# Patient Record
Sex: FEMALE | Race: WHITE | NOT HISPANIC OR LATINO | Employment: FULL TIME | URBAN - METROPOLITAN AREA
[De-identification: names, ages, dates, MRNs, and addresses within clinical notes are randomized per-mention and may not be internally consistent; named-entity substitution may affect disease eponyms.]

---

## 2017-01-12 ENCOUNTER — ANESTHESIA EVENT (OUTPATIENT)
Dept: SURGERY | Facility: HOSPITAL | Age: 42
End: 2017-01-12
Payer: COMMERCIAL

## 2017-01-13 ENCOUNTER — SURGERY (OUTPATIENT)
Age: 42
End: 2017-01-13

## 2017-01-13 ENCOUNTER — ANESTHESIA (OUTPATIENT)
Dept: SURGERY | Facility: HOSPITAL | Age: 42
End: 2017-01-13
Payer: COMMERCIAL

## 2017-01-13 PROBLEM — T84.84XA PAINFUL ORTHOPAEDIC HARDWARE: Status: ACTIVE | Noted: 2017-01-13

## 2017-01-13 PROCEDURE — 25000003 PHARM REV CODE 250: Mod: PO | Performed by: NURSE ANESTHETIST, CERTIFIED REGISTERED

## 2017-01-13 PROCEDURE — 27200651 HC AIRWAY, LMA: Mod: PO | Performed by: NURSE ANESTHETIST, CERTIFIED REGISTERED

## 2017-01-13 PROCEDURE — 63600175 PHARM REV CODE 636 W HCPCS: Mod: PO | Performed by: NURSE ANESTHETIST, CERTIFIED REGISTERED

## 2017-01-13 PROCEDURE — D9220A PRA ANESTHESIA: Mod: ANES,,, | Performed by: ANESTHESIOLOGY

## 2017-01-13 PROCEDURE — 63600175 PHARM REV CODE 636 W HCPCS: Mod: PO | Performed by: ORTHOPAEDIC SURGERY

## 2017-01-13 PROCEDURE — D9220A PRA ANESTHESIA: Mod: CRNA,,, | Performed by: NURSE ANESTHETIST, CERTIFIED REGISTERED

## 2017-01-13 RX ORDER — PROPOFOL 10 MG/ML
VIAL (ML) INTRAVENOUS
Status: DISCONTINUED | OUTPATIENT
Start: 2017-01-13 | End: 2017-01-13

## 2017-01-13 RX ORDER — ACETAMINOPHEN 10 MG/ML
INJECTION, SOLUTION INTRAVENOUS
Status: DISCONTINUED | OUTPATIENT
Start: 2017-01-13 | End: 2017-01-13

## 2017-01-13 RX ORDER — FENTANYL CITRATE 50 UG/ML
INJECTION, SOLUTION INTRAMUSCULAR; INTRAVENOUS
Status: DISCONTINUED | OUTPATIENT
Start: 2017-01-13 | End: 2017-01-13

## 2017-01-13 RX ORDER — LIDOCAINE HCL/PF 100 MG/5ML
SYRINGE (ML) INTRAVENOUS
Status: DISCONTINUED | OUTPATIENT
Start: 2017-01-13 | End: 2017-01-13

## 2017-01-13 RX ORDER — MIDAZOLAM HYDROCHLORIDE 1 MG/ML
INJECTION, SOLUTION INTRAMUSCULAR; INTRAVENOUS
Status: DISCONTINUED | OUTPATIENT
Start: 2017-01-13 | End: 2017-01-13

## 2017-01-13 RX ORDER — ONDANSETRON 2 MG/ML
INJECTION INTRAMUSCULAR; INTRAVENOUS
Status: DISCONTINUED | OUTPATIENT
Start: 2017-01-13 | End: 2017-01-13

## 2017-01-13 RX ADMIN — MIDAZOLAM HYDROCHLORIDE 2 MG: 1 INJECTION, SOLUTION INTRAMUSCULAR; INTRAVENOUS at 07:01

## 2017-01-13 RX ADMIN — FENTANYL CITRATE 25 MCG: 50 INJECTION, SOLUTION INTRAMUSCULAR; INTRAVENOUS at 08:01

## 2017-01-13 RX ADMIN — ONDANSETRON 4 MG: 2 INJECTION, SOLUTION INTRAMUSCULAR; INTRAVENOUS at 08:01

## 2017-01-13 RX ADMIN — PROPOFOL 150 MG: 10 INJECTION, EMULSION INTRAVENOUS at 07:01

## 2017-01-13 RX ADMIN — BUPIVACAINE HYDROCHLORIDE AND EPINEPHRINE BITARTRATE 10 ML: 2.5; .0091 INJECTION, SOLUTION INFILTRATION; PERINEURAL at 08:01

## 2017-01-13 RX ADMIN — LIDOCAINE HYDROCHLORIDE 75 MG: 20 INJECTION PARENTERAL at 07:01

## 2017-01-13 RX ADMIN — FENTANYL CITRATE 50 MCG: 50 INJECTION, SOLUTION INTRAMUSCULAR; INTRAVENOUS at 07:01

## 2017-01-13 RX ADMIN — CEFAZOLIN SODIUM 2 G: 2 SOLUTION INTRAVENOUS at 07:01

## 2017-01-13 RX ADMIN — ACETAMINOPHEN 1000 MG: 10 INJECTION, SOLUTION INTRAVENOUS at 08:01

## 2017-01-13 NOTE — ANESTHESIA PREPROCEDURE EVALUATION
01/13/2017  Karolyn Flowers is a 41 y.o., female.    [facility] Anesthesia Evaluation    I have reviewed the Patient Summary Reports.    I have reviewed the Nursing Notes.      Review of Systems  Anesthesia Hx:  No problems with previous Anesthesia    Hepatic/GI:   GERD, well controlled        Physical Exam  General:  Well nourished                 Anesthesia Plan  Type of Anesthesia, risks & benefits discussed:  Anesthesia Type:  general  Patient's Preference:   Intra-op Monitoring Plan:   Intra-op Monitoring Plan Comments:   Post Op Pain Control Plan:   Post Op Pain Control Plan Comments:   Induction:   IV  Beta Blocker:  Patient is not currently on a Beta-Blocker (No further documentation required).       Informed Consent: Patient understands risks and agrees with Anesthesia plan.  Questions answered. Anesthesia consent signed with patient.  ASA Score: 2     Day of Surgery Review of History & Physical:    H&P update referred to the surgeon.         Ready For Surgery From Anesthesia Perspective.

## 2017-01-13 NOTE — ANESTHESIA POSTPROCEDURE EVALUATION
"Anesthesia Post Evaluation    Patient: Karolyn Flowers    Procedure(s) Performed: Procedure(s) (LRB):  REMOVAL-HARDWARE-LEG-KNEE (Left)    Final Anesthesia Type: general  Patient location during evaluation: PACU  Patient participation: Yes- Able to Participate  Level of consciousness: awake and alert  Post-procedure vital signs: reviewed and stable  Pain management: adequate  Airway patency: patent  PONV status at discharge: No PONV  Anesthetic complications: no      Cardiovascular status: blood pressure returned to baseline and hemodynamically stable  Respiratory status: unassisted  Hydration status: euvolemic  Follow-up not needed.        Visit Vitals    BP (!) 111/57 (BP Location: Left arm, Patient Position: Lying, BP Method: Automatic)    Pulse 84    Temp 36.7 °C (98 °F) (Skin)    Resp 12    Ht 4' 11" (1.499 m)    Wt 53.5 kg (118 lb)    LMP 01/10/2017    SpO2 100%    Breastfeeding No    BMI 23.83 kg/m2       Pain/Marina Score: Pain Assessment Performed: Yes (1/13/2017  8:40 AM)  Presence of Pain: complains of pain/discomfort (1/13/2017  8:40 AM)  Pain Rating Prior to Med Admin: 3 (1/13/2017  8:50 AM)  Marina Score: 8 (1/13/2017  8:40 AM)      "

## 2017-01-13 NOTE — TRANSFER OF CARE
"Anesthesia Transfer of Care Note    Patient: Karolyn Flowers    Procedure(s) Performed: Procedure(s) (LRB):  REMOVAL-HARDWARE-LEG-KNEE (Left)    Patient location: PACU    Anesthesia Type: general    Transport from OR: Transported from OR on room air with adequate spontaneous ventilation    Post pain: adequate analgesia    Post assessment: no apparent anesthetic complications and tolerated procedure well    Post vital signs: stable    Level of consciousness: awake and alert    Nausea/Vomiting: no nausea/vomiting    Complications: none          Last vitals:   Visit Vitals    /74 (BP Location: Right arm, Patient Position: Lying, BP Method: Automatic)    Pulse 82    Temp 36.8 °C (98.3 °F) (Oral)    Resp 18    Ht 4' 11" (1.499 m)    Wt 53.5 kg (118 lb)    LMP 01/10/2017    SpO2 97%    Breastfeeding No    BMI 23.83 kg/m2     "

## 2017-01-25 ENCOUNTER — OFFICE VISIT (OUTPATIENT)
Dept: ORTHOPEDICS | Facility: CLINIC | Age: 42
End: 2017-01-25
Payer: COMMERCIAL

## 2017-01-25 VITALS
BODY MASS INDEX: 23.79 KG/M2 | HEART RATE: 85 BPM | DIASTOLIC BLOOD PRESSURE: 79 MMHG | WEIGHT: 118 LBS | HEIGHT: 59 IN | SYSTOLIC BLOOD PRESSURE: 114 MMHG

## 2017-01-25 DIAGNOSIS — T84.84XA PAINFUL ORTHOPAEDIC HARDWARE: Primary | ICD-10-CM

## 2017-01-25 PROCEDURE — 99024 POSTOP FOLLOW-UP VISIT: CPT | Mod: S$GLB,,, | Performed by: ORTHOPAEDIC SURGERY

## 2017-01-25 PROCEDURE — 99999 PR PBB SHADOW E&M-EST. PATIENT-LVL III: CPT | Mod: PBBFAC,,, | Performed by: ORTHOPAEDIC SURGERY

## 2017-01-25 NOTE — PROGRESS NOTES
This note was created using Dragon dictation software.  It occasionally misinterpreted phrases or words.      Date of surgery: January 13, 2017    Chief complaint: Left knee pain    History of present illness: 41-year-old female who underwent left hardware removal and bone grafting after removal of an anterior cruciate ligament screw and washer.  Patient is doing great.  No pain.  No wound complications.    Physical exam: Examination left knee shows well-healing surgical incision.  Mild puffiness underneath the incision.  No redness or erythema or drainage.    X-rays: None    Assessment: Status post left hardware removal    Plan: I reviewed the procedure with her today.  The puffiness is normal given the implementation of the bone allograft.  Okay to shower and bathe.  Okay to do some scar massage and skin lotion.  Follow-up in 3 weeks for final wound check.  Okay to start working out.

## 2017-02-15 ENCOUNTER — OFFICE VISIT (OUTPATIENT)
Dept: ORTHOPEDICS | Facility: CLINIC | Age: 42
End: 2017-02-15
Payer: COMMERCIAL

## 2017-02-15 VITALS
BODY MASS INDEX: 23.79 KG/M2 | HEIGHT: 59 IN | HEART RATE: 96 BPM | WEIGHT: 118 LBS | SYSTOLIC BLOOD PRESSURE: 120 MMHG | DIASTOLIC BLOOD PRESSURE: 74 MMHG

## 2017-02-15 DIAGNOSIS — T84.84XA PAINFUL ORTHOPAEDIC HARDWARE: Primary | ICD-10-CM

## 2017-02-15 PROCEDURE — 99024 POSTOP FOLLOW-UP VISIT: CPT | Mod: S$GLB,,, | Performed by: ORTHOPAEDIC SURGERY

## 2017-02-15 PROCEDURE — 99999 PR PBB SHADOW E&M-EST. PATIENT-LVL III: CPT | Mod: PBBFAC,,, | Performed by: ORTHOPAEDIC SURGERY

## 2017-02-15 RX ORDER — ESTRADIOL 10 UG/1
INSERT VAGINAL
COMMUNITY
Start: 2017-01-03

## 2017-02-15 NOTE — PROGRESS NOTES
This note was created using Dragon dictation software.  It occasionally misinterpreted phrases or words.      Date of surgery: January 13, 2017    Chief complaint: Left knee pain    History of present illness: 41-year-old female who underwent left hardware removal and bone grafting after removal of an anterior cruciate ligament screw and washer.  Patient is doing great.  No pain.  No wound complications.    Physical exam: Examination left knee shows well-healed surgical incision. No more puffiness underneath the incision.  No redness or erythema or drainage.    X-rays: None    Assessment: Status post left hardware removal    Plan: The puffiness is normal given the implementation of the bone allograft.  Activity as tolerated.  Follow-up as needed.

## 2021-09-24 ENCOUNTER — OFFICE VISIT (OUTPATIENT)
Dept: URGENT CARE | Facility: CLINIC | Age: 46
End: 2021-09-24
Payer: COMMERCIAL

## 2021-09-24 VITALS
HEART RATE: 103 BPM | BODY MASS INDEX: 23.39 KG/M2 | DIASTOLIC BLOOD PRESSURE: 73 MMHG | HEIGHT: 59 IN | TEMPERATURE: 98 F | OXYGEN SATURATION: 98 % | RESPIRATION RATE: 15 BRPM | SYSTOLIC BLOOD PRESSURE: 130 MMHG | WEIGHT: 116 LBS

## 2021-09-24 DIAGNOSIS — M54.9 UPPER BACK PAIN ON RIGHT SIDE: Primary | ICD-10-CM

## 2021-09-24 DIAGNOSIS — S29.019A THORACIC MYOFASCIAL STRAIN, INITIAL ENCOUNTER: ICD-10-CM

## 2021-09-24 PROCEDURE — 71046 XR CHEST PA AND LATERAL: ICD-10-PCS | Mod: S$GLB,,, | Performed by: RADIOLOGY

## 2021-09-24 PROCEDURE — 99203 OFFICE O/P NEW LOW 30 MIN: CPT | Mod: S$GLB,,, | Performed by: EMERGENCY MEDICINE

## 2021-09-24 PROCEDURE — 99203 PR OFFICE/OUTPT VISIT, NEW, LEVL III, 30-44 MIN: ICD-10-PCS | Mod: S$GLB,,, | Performed by: EMERGENCY MEDICINE

## 2021-09-24 PROCEDURE — 71046 X-RAY EXAM CHEST 2 VIEWS: CPT | Mod: S$GLB,,, | Performed by: RADIOLOGY

## 2021-09-24 RX ORDER — VORTIOXETINE 20 MG/1
TABLET, FILM COATED ORAL
COMMUNITY

## 2021-09-24 RX ORDER — THYROID 15 MG/1
TABLET ORAL
COMMUNITY

## 2021-09-24 RX ORDER — CYCLOBENZAPRINE HCL 10 MG
10 TABLET ORAL NIGHTLY
Qty: 5 TABLET | Refills: 0 | Status: SHIPPED | OUTPATIENT
Start: 2021-09-24 | End: 2021-09-29

## 2021-09-24 RX ORDER — PREDNISONE 20 MG/1
20 TABLET ORAL DAILY
Qty: 5 TABLET | Refills: 0 | Status: SHIPPED | OUTPATIENT
Start: 2021-09-24

## 2022-05-23 ENCOUNTER — OFFICE VISIT (OUTPATIENT)
Dept: URGENT CARE | Facility: CLINIC | Age: 47
End: 2022-05-23
Payer: COMMERCIAL

## 2022-05-23 VITALS
DIASTOLIC BLOOD PRESSURE: 81 MMHG | SYSTOLIC BLOOD PRESSURE: 123 MMHG | TEMPERATURE: 98 F | BODY MASS INDEX: 23.39 KG/M2 | OXYGEN SATURATION: 100 % | HEIGHT: 59 IN | HEART RATE: 97 BPM | WEIGHT: 116 LBS

## 2022-05-23 DIAGNOSIS — J02.9 SORE THROAT: ICD-10-CM

## 2022-05-23 DIAGNOSIS — U07.1 COVID-19: Primary | ICD-10-CM

## 2022-05-23 LAB
CTP QC/QA: YES
CTP QC/QA: YES
MOLECULAR STREP A: NEGATIVE
SARS-COV-2 RDRP RESP QL NAA+PROBE: POSITIVE

## 2022-05-23 PROCEDURE — U0002: ICD-10-PCS | Mod: QW,S$GLB,, | Performed by: PHYSICIAN ASSISTANT

## 2022-05-23 PROCEDURE — 99213 OFFICE O/P EST LOW 20 MIN: CPT | Mod: S$GLB,,, | Performed by: PHYSICIAN ASSISTANT

## 2022-05-23 PROCEDURE — 87651 STREP A DNA AMP PROBE: CPT | Mod: QW,S$GLB,, | Performed by: PHYSICIAN ASSISTANT

## 2022-05-23 PROCEDURE — 99213 PR OFFICE/OUTPT VISIT, EST, LEVL III, 20-29 MIN: ICD-10-PCS | Mod: S$GLB,,, | Performed by: PHYSICIAN ASSISTANT

## 2022-05-23 PROCEDURE — U0002 COVID-19 LAB TEST NON-CDC: HCPCS | Mod: QW,S$GLB,, | Performed by: PHYSICIAN ASSISTANT

## 2022-05-23 PROCEDURE — 87651 POCT STREP A MOLECULAR: ICD-10-PCS | Mod: QW,S$GLB,, | Performed by: PHYSICIAN ASSISTANT

## 2022-05-23 NOTE — PATIENT INSTRUCTIONS
Your test was POSITIVE for COVID-19 (coronavirus).       Please isolate yourself at home.  You may leave home and/or return to work once the following conditions are met:    If you were not hospitalized and are not moderately to severely immunocompromised:   More than 5 days since symptoms first appeared AND  More than 24 hours fever free without medications AND  Symptoms are improving  Continue to wear a mask around others for 5 additional days.    If you were hospitalized OR are moderately to severely immunocompromised:  More than 20 days since symptoms first appeared  More than 24 hours fever free without medications  Symptoms have improved    If you had no symptoms but tested positive:  More than 5 days since the date of the first positive test (20 days if moderately to severely immunocompromised). If you develop symptoms, then use the guidelines above.  Continue to wear a mask around others for 5 additional days.      Contact Tracing    As one of the next steps, you will receive a call or text from the [place] Department of Health ([facility]) COVID-19 Tracing Team. See the contact information below so you know not to ignore the health departments call. It is important that you contact them back immediately so they can help.      Contact Tracer Number:  [phone]  Caller ID for most carriers: [facility]     What is contact tracing?  Contact tracing is a process that helps identify everyone who has been in close contact with an infected person. Contact tracers let those people know they may have been exposed and guide them on next steps. Confidentiality is important for everyone; no one will be told who may have exposed them to the virus.  Your involvement is important. The more we know about where and how this virus is spreading, the better chance we have at stopping it from spreading further.  What does exposure mean?  Exposure means you have been within 6 feet for more than 15 minutes with a person who  has or had COVID-19.  What kind of questions do the contact tracers ask?  A contact tracer will confirm your basic contact information including name, address, phone number, and next of kin, as well as asking about any symptoms you may have had. Theyll also ask you how you think you may have gotten sick, such as places where you may have been exposed to the virus, and people you were with. Those names will never be shared with anyone outside of that call, and will only be used to help trace and stop the spread of the virus.   I have privacy concerns. How will the state use my information?  Your privacy about your health is important. All calls are completed using call centers that use the appropriate health privacy protection measures (HIPAA compliance), meaning that your patient information is safe. No one will ever ask you any questions related to immigration status. Your health comes first.   Do I have to participate?  You do not have to participate, but we strongly encourage you to. Contact tracing can help us catch and control new outbreaks as theyre developing to keep your friends and family safe.   What if I dont hear from anyone?  If you dont receive a call within 24 hours, you can call the number above right away to inquire about your status. That line is open from 8:00 am - 8:00 p.m., 7 days a week.  Contact tracing saves lives! Together, we have the power to beat this virus and keep our loved ones and neighbors safe.    For more information see CDC link below.      https://www.cdc.gov/coronavirus/2019-ncov/hcp/guidance-prevent-spread.html#precautions        Sources:  [facility], [place] [facility] and [facility]           Sincerely,     SUMA Church

## 2022-05-23 NOTE — LETTER
[address] [address], [address] ? [place] [address] ? Phone [phone] ? Fax [phone]           Return to Work/School    Patient: Karolyn Flowers  YOB: 1975   Date: 05/23/2022      To Whom It May Concern:     Karolyn Flowers was in contact with/seen in my office on 05/23/2022. COVID-19 is present in our communities across the [facility]. Not all patients are eligible or appropriate to be tested. In this situation, your employee meets the following criteria:     Karolyn Flowers has met the criteria for COVID-19 testing and has a POSITIVE result. She can return to work once they are asymptomatic for 24 hours without the use of fever reducing medications AND at least five days from the start of symptoms (or from the first positive result if they have no symptoms).      If you have any questions or concerns, or if I can be of further assistance, please do not hesitate to contact me.     Sincerely,    SUMA Church

## 2022-05-23 NOTE — PROGRESS NOTES
"Subjective:       Patient ID: Karolyn Flowers is a 47 y.o. female.    Vitals:  height is 4' 11" (1.499 m) and weight is 52.6 kg (116 lb). Her temperature is 97.8 °F (36.6 °C). Her blood pressure is 123/81 and her pulse is 97. Her oxygen saturation is 100%.     Chief Complaint: Sore Throat    Patient presents to urgent care today for sore throat and headaches that started yesterday  Patient has taken OTC medications with no symptom relief.   Patient has not had any known recent exposure to COVID.    Other  This is a new problem. The current episode started in the past 7 days. The problem occurs constantly. The problem has been gradually worsening. Associated symptoms include a sore throat. Pertinent negatives include no abdominal pain, anorexia, arthralgias, change in bowel habit, chest pain, chills, congestion, coughing, diaphoresis, fatigue, fever, headaches, joint swelling, myalgias, nausea, neck pain, numbness, rash, swollen glands, urinary symptoms, vertigo, visual change, vomiting or weakness. Treatments tried: OTC meds. The treatment provided no relief.       Constitution: Negative for chills, sweating, fatigue and fever.   HENT: Positive for sore throat. Negative for congestion.    Neck: Negative for neck pain.   Cardiovascular: Negative for chest pain.   Respiratory: Negative for cough.    Gastrointestinal: Negative for abdominal pain, nausea and vomiting.   Musculoskeletal: Negative for joint pain, joint swelling and muscle ache.   Skin: Negative for rash.   Neurological: Negative for history of vertigo, headaches and numbness.       Objective:      Physical Exam   Constitutional: She does not appear ill. No distress.   HENT:   Head: Normocephalic and atraumatic.   Ears:   Right Ear: External ear normal.   Left Ear: External ear normal.   Mouth/Throat: Mucous membranes are moist. Posterior oropharyngeal erythema present. No oropharyngeal exudate. Oropharynx is clear.   Eyes: Conjunctivae are normal. Right " eye exhibits no discharge. Left eye exhibits no discharge. Extraocular movement intact   Cardiovascular: Normal rate, regular rhythm and normal heart sounds.   No murmur heard.  Pulmonary/Chest: Effort normal and breath sounds normal. She has no wheezes. She has no rhonchi. She has no rales.   Abdominal: Normal appearance.   Musculoskeletal: Normal range of motion.         General: Normal range of motion.   Neurological: no focal deficit. She is alert.   Skin: Skin is warm, dry and not pale. jaundice  Psychiatric: Her behavior is normal. Mood, judgment and thought content normal.   Nursing note and vitals reviewed.        Assessment:       1. COVID-19    2. Sore throat          Plan:         COVID-19    Sore throat  -     POCT Strep A, Molecular  -     POCT COVID-19 Rapid Screening    Results for orders placed or performed in visit on 05/23/22   POCT Strep A, Molecular   Result Value Ref Range    Molecular Strep A, POC Negative Negative     Acceptable Yes    POCT COVID-19 Rapid Screening   Result Value Ref Range    POC Rapid COVID Positive (A) Negative     Acceptable Yes         Discussed symptomatic treatment.  Discussed sign/symptoms, reasons would need re-evaluation.        Patient Instructions   Your test was POSITIVE for COVID-19 (coronavirus).       Please isolate yourself at home.  You may leave home and/or return to work once the following conditions are met:    If you were not hospitalized and are not moderately to severely immunocompromised:    More than 5 days since symptoms first appeared AND   More than 24 hours fever free without medications AND   Symptoms are improving   Continue to wear a mask around others for 5 additional days.    If you were hospitalized OR are moderately to severely immunocompromised:   More than 20 days since symptoms first appeared   More than 24 hours fever free without medications   Symptoms have improved    If you had no symptoms but  tested positive:   More than 5 days since the date of the first positive test (20 days if moderately to severely immunocompromised). If you develop symptoms, then use the guidelines above.   Continue to wear a mask around others for 5 additional days.      Contact Tracing    As one of the next steps, you will receive a call or text from the [place] Department of Health ([facility]) COVID-19 Tracing Team. See the contact information below so you know not to ignore the health departments call. It is important that you contact them back immediately so they can help.      Contact Tracer Number:  [phone]  Caller ID for most carriers: [facility]     What is contact tracing?  · Contact tracing is a process that helps identify everyone who has been in close contact with an infected person. Contact tracers let those people know they may have been exposed and guide them on next steps. Confidentiality is important for everyone; no one will be told who may have exposed them to the virus.  · Your involvement is important. The more we know about where and how this virus is spreading, the better chance we have at stopping it from spreading further.  What does exposure mean?  · Exposure means you have been within 6 feet for more than 15 minutes with a person who has or had COVID-19.  What kind of questions do the contact tracers ask?  · A contact tracer will confirm your basic contact information including name, address, phone number, and next of kin, as well as asking about any symptoms you may have had. Theyll also ask you how you think you may have gotten sick, such as places where you may have been exposed to the virus, and people you were with. Those names will never be shared with anyone outside of that call, and will only be used to help trace and stop the spread of the virus.   I have privacy concerns. How will the state use my information?  · Your privacy about your health is important. All calls are completed  using call centers that use the appropriate health privacy protection measures (HIPAA compliance), meaning that your patient information is safe. No one will ever ask you any questions related to immigration status. Your health comes first.   Do I have to participate?  · You do not have to participate, but we strongly encourage you to. Contact tracing can help us catch and control new outbreaks as theyre developing to keep your friends and family safe.   What if I dont hear from anyone?  · If you dont receive a call within 24 hours, you can call the number above right away to inquire about your status. That line is open from 8:00 am - 8:00 p.m., 7 days a week.  Contact tracing saves lives! Together, we have the power to beat this virus and keep our loved ones and neighbors safe.    For more information see CDC link below.      https://www.cdc.gov/coronavirus/2019-ncov/hcp/guidance-prevent-spread.html#precautions        Sources:  [facility], [facility] and [facility]           Sincerely,     SUMA Church

## 2022-05-26 ENCOUNTER — TELEPHONE (OUTPATIENT)
Dept: URGENT CARE | Facility: CLINIC | Age: 47
End: 2022-05-26
Payer: COMMERCIAL

## 2023-08-10 ENCOUNTER — OFFICE VISIT (OUTPATIENT)
Dept: PULMONOLOGY | Facility: CLINIC | Age: 48
End: 2023-08-10
Payer: COMMERCIAL

## 2023-08-10 ENCOUNTER — HOSPITAL ENCOUNTER (OUTPATIENT)
Dept: RADIOLOGY | Facility: HOSPITAL | Age: 48
Discharge: HOME | End: 2023-08-10
Attending: NURSE PRACTITIONER
Payer: COMMERCIAL

## 2023-08-10 VITALS
DIASTOLIC BLOOD PRESSURE: 84 MMHG | BODY MASS INDEX: 22.76 KG/M2 | HEIGHT: 59 IN | OXYGEN SATURATION: 99 % | WEIGHT: 112.88 LBS | SYSTOLIC BLOOD PRESSURE: 124 MMHG | HEART RATE: 98 BPM

## 2023-08-10 DIAGNOSIS — Z22.7 TB LUNG, LATENT: Primary | ICD-10-CM

## 2023-08-10 DIAGNOSIS — Z22.7 TB LUNG, LATENT: ICD-10-CM

## 2023-08-10 PROCEDURE — 71046 XR CHEST PA AND LATERAL: ICD-10-PCS | Mod: 26,,, | Performed by: RADIOLOGY

## 2023-08-10 PROCEDURE — 71046 X-RAY EXAM CHEST 2 VIEWS: CPT | Mod: 26,,, | Performed by: RADIOLOGY

## 2023-08-10 PROCEDURE — 99204 OFFICE O/P NEW MOD 45 MIN: CPT | Mod: S$GLB,,, | Performed by: NURSE PRACTITIONER

## 2023-08-10 PROCEDURE — 71046 X-RAY EXAM CHEST 2 VIEWS: CPT | Mod: TC

## 2023-08-10 PROCEDURE — 99999 PR PBB SHADOW E&M-EST. PATIENT-LVL IV: CPT | Mod: PBBFAC,,, | Performed by: NURSE PRACTITIONER

## 2023-08-10 PROCEDURE — 99999 PR PBB SHADOW E&M-EST. PATIENT-LVL IV: ICD-10-PCS | Mod: PBBFAC,,, | Performed by: NURSE PRACTITIONER

## 2023-08-10 PROCEDURE — 99204 PR OFFICE/OUTPT VISIT, NEW, LEVL IV, 45-59 MIN: ICD-10-PCS | Mod: S$GLB,,, | Performed by: NURSE PRACTITIONER

## 2023-08-10 RX ORDER — TIRZEPATIDE 12.5 MG/.5ML
INJECTION, SOLUTION SUBCUTANEOUS
COMMUNITY
Start: 2023-08-09

## 2023-08-10 NOTE — LETTER
August 10, 2023        Karolyn Flowers  [address]  [place] [address]             [facility]  [address]  [place] [address]  Phone: [phone]  Fax: [phone]   Patient: Karolyn Flowers   YOB: 1975   Date of Visit: 8/10/2023     To whom it may concern:    [facility]. Flowers [facility] for positive   quantiferon gold test. Chest x-ray on 8/10/2023 negative   for acute or chronic changes. She is considered non contagious   and no restrictions.           If you have any questions or concerns, please don't hesitate to call.         Sincerely,          Britta Weiss, NP

## 2023-08-10 NOTE — PROGRESS NOTES
"8/10/2023    Karolyn Flowers  New Patient Consult    Chief Complaint   Patient presents with    positive tb       HPI: 8/10/2023- positive TB quantiferon gold blood test, works as . Goes into hospital clinics daily.   No complaint of cough, shortness of breath, chest tightness, or wheeze. No recent weight loss or night sweats.     Social Hx: lives with family and pet dog, no known Asbestosis exposure, no Smoking Hx  Family Hx: no Lung Cancer, COPD, or Asthma  Medical Hx: no previous pneumonia ; no previous shoulder/chest surgery      The chief compliant  problem is new to me  PFSH:  Past Medical History:   Diagnosis Date    Acid reflux     Depression     Fever blister          Past Surgical History:   Procedure Laterality Date    ACL replacement      left knee    BLADDER SUSPENSION      TUBAL LIGATION       Social History     Tobacco Use    Smoking status: Never    Smokeless tobacco: Never   Substance Use Topics    Alcohol use: Yes     Alcohol/week: 2.0 - 3.0 standard drinks of alcohol     Types: 2 - 3 Glasses of wine per week     Comment: social     Drug use: No     No family history on file.  Review of patient's allergies indicates:  No Known Allergies  I have reviewed past medical, family, and social history. I have reviewed previous nurse notes.        Performance Status:The patient's activity level is no limits with regular activity.      Review of Systems:  a review of eleven systems covering constitutional, Eye, HEENT, Psych, Respiratory, Cardiac, GI, , Musculoskeletal, Endocrine, Dermatologic was negative except for pertinent findings as listed ABOVE and below: pertinent positive as above, rest is good           Exam:Comprehensive exam done. /84 (BP Location: Right arm, Patient Position: Sitting, BP Method: Medium (Automatic))   Pulse 98   Ht 4' 11" (1.499 m)   Wt 51.2 kg (112 lb 14 oz)   SpO2 99% Comment: on room air at rest  BMI 22.80 kg/m²   Exam included Vitals " as listed  Constitutional:  oriented to person, place, and time.  appears well-developed. No distress.   Nose: Nose normal.   Mouth/Throat: Uvula is midline, oropharynx is clear and moist and mucous membranes are normal. No dental caries. No oropharyngeal exudate, posterior oropharyngeal edema, posterior oropharyngeal erythema or tonsillar abscesses.  Mallapatti (M) score 2  Eyes: Pupils are equal, round, and reactive to light.   Neck: No JVD present. No thyromegaly present.   Cardiovascular: Normal rate, regular rhythm and normal heart sounds. Exam reveals no gallop and no friction rub.   No murmur heard.  Pulmonary/Chest: Effort normal and breath sounds normal. No accessory muscle usage or stridor. No apnea and no tachypnea. No respiratory distress, decreased breath sounds, wheezes, rhonchi, rales, or tenderness.   Abdominal: Soft. exhibits no mass. There is no tenderness. No hepatosplenomegaly, hernias and normoactive bowel sounds  Musculoskeletal: Normal range of motion. exhibits no edema.   Lymphadenopathy:     no cervical adenopathy.   Neurological:  alert and oriented to person, place, and time. not disoriented.   Skin: Skin is warm and dry. Capillary refill takes less 2 sec. No cyanosis or erythema. No pallor. Nails show no clubbing.   Psychiatric: normal mood and affect. behavior is normal. Judgment and thought content normal.       Radiographs (ct chest and cxr) reviewed:   patient imaging studies reviewed and interpreted independently. My personal interpretation of most resent images include:    X-Ray Chest PA And Lateral  08/10/2023 Lungs clear        Labs: Patients labs reviewed including CBC and CMP  reviewed       Lab Results   Component Value Date    WBC 5.36 12/21/2016    RBC 4.51 12/21/2016    HGB 13.6 12/21/2016    HCT 41.4 12/21/2016    MCV 92 12/21/2016    MCH 30.2 12/21/2016    MCHC 32.9 12/21/2016    RDW 12.5 12/21/2016     12/21/2016    MPV 10.7 12/21/2016    GRAN 3.0 12/21/2016     GRAN 56.1 12/21/2016    LYMPH 1.8 12/21/2016    LYMPH 33.4 12/21/2016    MONO 0.5 12/21/2016    MONO 8.4 12/21/2016    EOS 0.1 12/21/2016    BASO 0.02 12/21/2016    EOSINOPHIL 1.5 12/21/2016    BASOPHIL 0.4 12/21/2016      Latest Reference Range & Units Most Recent   CO2 23 - 29 mmol/L 29  12/21/16 09:03     PFT was not done  Pulmonary Functions Testing Results:        Plan:  Clinical impression is apparently straight forward and impression with management as below.    Karolyn was seen today for positive tb.    Diagnoses and all orders for this visit:    TB lung, latent  -     T-SPOT TB Screening Test; Future    0    I spent over 50 mins of time with the patient. Reviewed results of the recently ordered labs, tests and studies; made directives with regards to the results. Over half of this time was spent couseling and coordinating care.     I have explained all of the above in detail and the patient understands all of the current recommendation(s). I have answered all of their questions to the best of my ability and to their complete satisfaction.   The patient is to continue with the current management plan.  I spent a total of 50 minutes on the day of the visit.This includes face to face time and non-face to face time preparing to see the patient (eg, review of tests), obtaining and/or reviewing separately obtained history, documenting clinical information in the electronic or other health record, independently interpreting results and communicating results to the patient/family/caregiver, or care coordinator.        Follow up in about 4 weeks (around 9/7/2023), or if symptoms worsen or fail to improve.      Discussed with patient above for education the following:      Patient Instructions   We discussed new options to treat Latent TB if Tspot test is positive

## 2023-08-15 RX ORDER — RIFAMPIN 150 MG/1
450 CAPSULE ORAL DAILY
Qty: 90 CAPSULE | Refills: 3 | Status: SHIPPED | OUTPATIENT
Start: 2023-08-15 | End: 2023-12-14 | Stop reason: ALTCHOICE

## 2023-09-15 ENCOUNTER — OFFICE VISIT (OUTPATIENT)
Dept: PULMONOLOGY | Facility: CLINIC | Age: 48
End: 2023-09-15
Payer: COMMERCIAL

## 2023-09-15 VITALS
SYSTOLIC BLOOD PRESSURE: 115 MMHG | DIASTOLIC BLOOD PRESSURE: 71 MMHG | HEART RATE: 100 BPM | OXYGEN SATURATION: 99 % | BODY MASS INDEX: 22.8 KG/M2 | HEIGHT: 59 IN

## 2023-09-15 DIAGNOSIS — Z22.7 TB LUNG, LATENT: Primary | ICD-10-CM

## 2023-09-15 PROCEDURE — 99999 PR PBB SHADOW E&M-EST. PATIENT-LVL III: CPT | Mod: PBBFAC,,, | Performed by: NURSE PRACTITIONER

## 2023-09-15 PROCEDURE — 99999 PR PBB SHADOW E&M-EST. PATIENT-LVL III: ICD-10-PCS | Mod: PBBFAC,,, | Performed by: NURSE PRACTITIONER

## 2023-09-15 PROCEDURE — 99213 PR OFFICE/OUTPT VISIT, EST, LEVL III, 20-29 MIN: ICD-10-PCS | Mod: S$GLB,,, | Performed by: NURSE PRACTITIONER

## 2023-09-15 PROCEDURE — 99213 OFFICE O/P EST LOW 20 MIN: CPT | Mod: S$GLB,,, | Performed by: NURSE PRACTITIONER

## 2023-09-15 RX ORDER — DARIFENACIN 15 MG/1
15 TABLET, EXTENDED RELEASE ORAL
COMMUNITY
Start: 2023-08-23

## 2023-09-15 RX ORDER — FLUOXETINE HYDROCHLORIDE 20 MG/1
20 CAPSULE ORAL EVERY MORNING
COMMUNITY
Start: 2023-09-11

## 2023-09-15 NOTE — PROGRESS NOTES
9/15/2023    Karolyn Flowers  Office Note    Chief Complaint   Patient presents with    Follow-up    latent TB       HPI:   9/15/2023- states doing well, latent TB medications has no intolerable side effects. On 3rd week of therapy Rifampin 450 mg. States nausea at first starting new medication but is improved with care.  No complaint of cough, shortness of breath, wheeze, or chest tightness.     8/10/2023- positive TB quantiferon gold blood test, works as . Goes into hospital clinics daily.   No complaint of cough, shortness of breath, chest tightness, or wheeze. No recent weight loss or night sweats.     Social Hx: lives with family and pet dog, no known Asbestosis exposure, no Smoking Hx  Family Hx: no Lung Cancer, COPD, or Asthma  Medical Hx: no previous pneumonia ; no previous shoulder/chest surgery      The chief compliant  problem is stable  PFSH:  Past Medical History:   Diagnosis Date    Acid reflux     Depression     Fever blister          Past Surgical History:   Procedure Laterality Date    ACL replacement      left knee    BLADDER SUSPENSION      TUBAL LIGATION       Social History     Tobacco Use    Smoking status: Never    Smokeless tobacco: Never   Substance Use Topics    Alcohol use: Yes     Alcohol/week: 2.0 - 3.0 standard drinks of alcohol     Types: 2 - 3 Glasses of wine per week     Comment: social     Drug use: No     No family history on file.  Review of patient's allergies indicates:  No Known Allergies  I have reviewed past medical, family, and social history. I have reviewed previous nurse notes.        Performance Status:The patient's activity level is no limits with regular activity.      Review of Systems:  a review of eleven systems covering constitutional, Eye, HEENT, Psych, Respiratory, Cardiac, GI, , Musculoskeletal, Endocrine, Dermatologic was negative except for pertinent findings as listed ABOVE and below: pertinent positive as above, rest is  "good           Exam:Comprehensive exam done. /71 (BP Location: Left arm, Patient Position: Sitting, BP Method: Medium (Automatic))   Pulse 100   Ht 4' 11" (1.499 m)   SpO2 99% Comment: on room air at rest  BMI 22.80 kg/m²   Exam included Vitals as listed, and patient's appearance and affect and alertness and mood, oral exam for yeast and hygiene and pharynx lesions and Mallapatti (M) score, neck with inspection for jvd and masses and thyroid abnormalities and lymph nodes (supraclavicular and infraclavicular nodes and axillary also examined and noted if abn), chest exam included symmetry and effort and fremitus and percussion and auscultation, cardiac exam included rhythm and gallops and murmur and rubs and jvd and edema, abdominal exam for mass and hepatosplenomegaly and tenderness and hernias and bowel sounds, Musculoskeletal exam with muscle tone and posture and mobility/gait and  strength, and skin for rashes and cyanosis and pallor and turgor, extremity for clubbing.  Findings were normal except for pertinent findings listed below:   M2  Breath sounds      Radiographs (ct chest and cxr) reviewed:   patient imaging studies reviewed and interpreted independently. My personal interpretation of most resent images include:    X-Ray Chest PA And Lateral  08/10/2023 Lungs clear      Labs: Patients labs reviewed including CBC and CMP  reviewed       Lab Results   Component Value Date    WBC 5.36 12/21/2016    RBC 4.51 12/21/2016    HGB 13.6 12/21/2016    HCT 41.4 12/21/2016    MCV 92 12/21/2016    MCH 30.2 12/21/2016    MCHC 32.9 12/21/2016    RDW 12.5 12/21/2016     12/21/2016    MPV 10.7 12/21/2016    GRAN 3.0 12/21/2016    GRAN 56.1 12/21/2016    LYMPH 1.8 12/21/2016    LYMPH 33.4 12/21/2016    MONO 0.5 12/21/2016    MONO 8.4 12/21/2016    EOS 0.1 12/21/2016    BASO 0.02 12/21/2016    EOSINOPHIL 1.5 12/21/2016    BASOPHIL 0.4 12/21/2016      Latest Reference Range & Units Most Recent   CO2 23 - " 29 mmol/L 29  12/21/16 09:03      Latest Reference Range & Units 05/23/22 16:58   SARS-CoV-2 RNA, Amplification, Qual Negative  Positive !   !: Data is abnormal      PFT reviewed  Pulmonary Functions Testing Results:        Plan:  Clinical impression is apparently straight forward and impression with management as below.    Karolyn was seen today for follow-up and latent tb.    Diagnoses and all orders for this visit:    TB lung, latent  Comments:  - continue current Rifampin prescription        Follow up in about 3 months (around 12/15/2023), or if symptoms worsen or fail to improve.      Discussed with patient above for education the following:      Patient Instructions   Continue Rifampin daily    Have blood test  to monitor liver health every month.

## 2023-09-18 ENCOUNTER — LAB VISIT (OUTPATIENT)
Dept: LAB | Facility: HOSPITAL | Age: 48
End: 2023-09-18
Attending: NURSE PRACTITIONER
Payer: COMMERCIAL

## 2023-09-18 DIAGNOSIS — Z22.7 TB LUNG, LATENT: ICD-10-CM

## 2023-09-18 LAB
ALBUMIN SERPL BCP-MCNC: 3.9 G/DL (ref 3.5–5.2)
ALP SERPL-CCNC: 49 U/L (ref 55–135)
ALT SERPL W/O P-5'-P-CCNC: 14 U/L (ref 10–44)
ANION GAP SERPL CALC-SCNC: 9 MMOL/L (ref 8–16)
AST SERPL-CCNC: 17 U/L (ref 10–40)
BILIRUB SERPL-MCNC: 0.4 MG/DL (ref 0.1–1)
BUN SERPL-MCNC: 6 MG/DL (ref 6–20)
CALCIUM SERPL-MCNC: 8.8 MG/DL (ref 8.7–10.5)
CHLORIDE SERPL-SCNC: 106 MMOL/L (ref 95–110)
CO2 SERPL-SCNC: 24 MMOL/L (ref 23–29)
CREAT SERPL-MCNC: 0.7 MG/DL (ref 0.5–1.4)
EST. GFR  (NO RACE VARIABLE): >60 ML/MIN/1.73 M^2
GLUCOSE SERPL-MCNC: 82 MG/DL (ref 70–110)
POTASSIUM SERPL-SCNC: 4 MMOL/L (ref 3.5–5.1)
PROT SERPL-MCNC: 6.3 G/DL (ref 6–8.4)
SODIUM SERPL-SCNC: 139 MMOL/L (ref 136–145)

## 2023-09-18 PROCEDURE — 36415 COLL VENOUS BLD VENIPUNCTURE: CPT | Performed by: NURSE PRACTITIONER

## 2023-09-18 PROCEDURE — 80053 COMPREHEN METABOLIC PANEL: CPT | Performed by: NURSE PRACTITIONER

## 2023-10-19 ENCOUNTER — LAB VISIT (OUTPATIENT)
Dept: LAB | Facility: HOSPITAL | Age: 48
End: 2023-10-19
Attending: NURSE PRACTITIONER
Payer: COMMERCIAL

## 2023-10-19 DIAGNOSIS — Z22.7 TB LUNG, LATENT: ICD-10-CM

## 2023-10-19 LAB
ALBUMIN SERPL BCP-MCNC: 4.1 G/DL (ref 3.5–5.2)
ALP SERPL-CCNC: 51 U/L (ref 55–135)
ALT SERPL W/O P-5'-P-CCNC: 13 U/L (ref 10–44)
ANION GAP SERPL CALC-SCNC: 11 MMOL/L (ref 8–16)
AST SERPL-CCNC: 19 U/L (ref 10–40)
BILIRUB SERPL-MCNC: 0.4 MG/DL (ref 0.1–1)
BUN SERPL-MCNC: 8 MG/DL (ref 6–20)
CALCIUM SERPL-MCNC: 9.2 MG/DL (ref 8.7–10.5)
CHLORIDE SERPL-SCNC: 104 MMOL/L (ref 95–110)
CO2 SERPL-SCNC: 25 MMOL/L (ref 23–29)
CREAT SERPL-MCNC: 0.8 MG/DL (ref 0.5–1.4)
EST. GFR  (NO RACE VARIABLE): >60 ML/MIN/1.73 M^2
GLUCOSE SERPL-MCNC: 79 MG/DL (ref 70–110)
POTASSIUM SERPL-SCNC: 4.1 MMOL/L (ref 3.5–5.1)
PROT SERPL-MCNC: 6.7 G/DL (ref 6–8.4)
SODIUM SERPL-SCNC: 140 MMOL/L (ref 136–145)

## 2023-10-19 PROCEDURE — 36415 COLL VENOUS BLD VENIPUNCTURE: CPT | Performed by: NURSE PRACTITIONER

## 2023-10-19 PROCEDURE — 80053 COMPREHEN METABOLIC PANEL: CPT | Performed by: NURSE PRACTITIONER

## 2023-11-20 ENCOUNTER — LAB VISIT (OUTPATIENT)
Dept: LAB | Facility: HOSPITAL | Age: 48
End: 2023-11-20
Attending: NURSE PRACTITIONER
Payer: COMMERCIAL

## 2023-11-20 DIAGNOSIS — Z22.7 TB LUNG, LATENT: ICD-10-CM

## 2023-11-20 LAB
ALBUMIN SERPL BCP-MCNC: 3.7 G/DL (ref 3.5–5.2)
ALP SERPL-CCNC: 39 U/L (ref 55–135)
ALT SERPL W/O P-5'-P-CCNC: 13 U/L (ref 10–44)
ANION GAP SERPL CALC-SCNC: 7 MMOL/L (ref 8–16)
AST SERPL-CCNC: 17 U/L (ref 10–40)
BILIRUB SERPL-MCNC: 0.4 MG/DL (ref 0.1–1)
BUN SERPL-MCNC: 13 MG/DL (ref 6–20)
CALCIUM SERPL-MCNC: 8.3 MG/DL (ref 8.7–10.5)
CHLORIDE SERPL-SCNC: 106 MMOL/L (ref 95–110)
CO2 SERPL-SCNC: 25 MMOL/L (ref 23–29)
CREAT SERPL-MCNC: 0.7 MG/DL (ref 0.5–1.4)
EST. GFR  (NO RACE VARIABLE): >60 ML/MIN/1.73 M^2
GLUCOSE SERPL-MCNC: 105 MG/DL (ref 70–110)
POTASSIUM SERPL-SCNC: 4 MMOL/L (ref 3.5–5.1)
PROT SERPL-MCNC: 5.8 G/DL (ref 6–8.4)
SODIUM SERPL-SCNC: 138 MMOL/L (ref 136–145)

## 2023-11-20 PROCEDURE — 36415 COLL VENOUS BLD VENIPUNCTURE: CPT | Performed by: NURSE PRACTITIONER

## 2023-11-20 PROCEDURE — 80053 COMPREHEN METABOLIC PANEL: CPT | Performed by: NURSE PRACTITIONER

## 2023-11-28 DIAGNOSIS — Z22.7 TB LUNG, LATENT: ICD-10-CM

## 2023-11-29 RX ORDER — RIFAMPIN 150 MG/1
450 CAPSULE ORAL DAILY
Qty: 90 CAPSULE | Refills: 3 | OUTPATIENT
Start: 2023-11-29

## 2023-12-14 ENCOUNTER — OFFICE VISIT (OUTPATIENT)
Dept: PULMONOLOGY | Facility: CLINIC | Age: 48
End: 2023-12-14
Payer: COMMERCIAL

## 2023-12-14 VITALS
BODY MASS INDEX: 22.8 KG/M2 | HEIGHT: 59 IN | HEART RATE: 85 BPM | OXYGEN SATURATION: 97 % | SYSTOLIC BLOOD PRESSURE: 104 MMHG | DIASTOLIC BLOOD PRESSURE: 74 MMHG

## 2023-12-14 DIAGNOSIS — B00.9 HERPES SIMPLEX INFECTION OF SKIN: ICD-10-CM

## 2023-12-14 DIAGNOSIS — Z22.7 TB LUNG, LATENT: Primary | ICD-10-CM

## 2023-12-14 PROCEDURE — 99999 PR PBB SHADOW E&M-EST. PATIENT-LVL IV: ICD-10-PCS | Mod: PBBFAC,,, | Performed by: NURSE PRACTITIONER

## 2023-12-14 PROCEDURE — 99999 PR PBB SHADOW E&M-EST. PATIENT-LVL IV: CPT | Mod: PBBFAC,,, | Performed by: NURSE PRACTITIONER

## 2023-12-14 PROCEDURE — 99213 OFFICE O/P EST LOW 20 MIN: CPT | Mod: S$GLB,,, | Performed by: NURSE PRACTITIONER

## 2023-12-14 PROCEDURE — 99213 PR OFFICE/OUTPT VISIT, EST, LEVL III, 20-29 MIN: ICD-10-PCS | Mod: S$GLB,,, | Performed by: NURSE PRACTITIONER

## 2023-12-14 RX ORDER — ACYCLOVIR 50 MG/G
OINTMENT TOPICAL
Qty: 30 G | Refills: 0 | Status: SHIPPED | OUTPATIENT
Start: 2023-12-14

## 2023-12-14 NOTE — PROGRESS NOTES
12/14/2023    Karolyn Flowers  Office Note    Chief Complaint   Patient presents with    Follow-up    Latent TB       HPI:     12/14/2023- completed Latent TB therapy for 4 months. No complaint of cough or night sweats.       9/15/2023- states doing well, latent TB medications has no intolerable side effects. On 3rd week of therapy Rifampin 450 mg. States nausea at first starting new medication but is improved with care.  No complaint of cough, shortness of breath, wheeze, or chest tightness.     8/10/2023- positive TB quantiferon gold blood test, works as . Goes into hospital clinics daily.   No complaint of cough, shortness of breath, chest tightness, or wheeze. No recent weight loss or night sweats.     Social Hx: lives with family and pet dog, no known Asbestosis exposure, no Smoking Hx  Family Hx: no Lung Cancer, COPD, or Asthma  Medical Hx: no previous pneumonia ; no previous shoulder/chest surgery      The chief compliant  problem is stable  PFSH:  Past Medical History:   Diagnosis Date    Acid reflux     Depression     Fever blister          Past Surgical History:   Procedure Laterality Date    ACL replacement      left knee    BLADDER SUSPENSION      TUBAL LIGATION       Social History     Tobacco Use    Smoking status: Never    Smokeless tobacco: Never   Substance Use Topics    Alcohol use: Yes     Alcohol/week: 2.0 - 3.0 standard drinks of alcohol     Types: 2 - 3 Glasses of wine per week     Comment: social     Drug use: No     No family history on file.  Review of patient's allergies indicates:  No Known Allergies  I have reviewed past medical, family, and social history. I have reviewed previous nurse notes.        Performance Status:The patient's activity level is no limits with regular activity.      Review of Systems:  a review of eleven systems covering constitutional, Eye, HEENT, Psych, Respiratory, Cardiac, GI, , Musculoskeletal, Endocrine, Dermatologic was  "negative except for pertinent findings as listed ABOVE and below: pertinent positive as above, rest is good           Exam:Comprehensive exam done. /74 (BP Location: Right arm, Patient Position: Sitting, BP Method: Medium (Automatic))   Pulse 85   Ht 4' 11" (1.499 m)   SpO2 97% Comment: on room air at rest  BMI 22.80 kg/m²   Exam included Vitals as listed, and patient's appearance and affect and alertness and mood, oral exam for yeast and hygiene and pharynx lesions and Mallapatti (M) score, neck with inspection for jvd and masses and thyroid abnormalities and lymph nodes (supraclavicular and infraclavicular nodes and axillary also examined and noted if abn), chest exam included symmetry and effort and fremitus and percussion and auscultation, cardiac exam included rhythm and gallops and murmur and rubs and jvd and edema, abdominal exam for mass and hepatosplenomegaly and tenderness and hernias and bowel sounds, Musculoskeletal exam with muscle tone and posture and mobility/gait and  strength, and skin for rashes and cyanosis and pallor and turgor, extremity for clubbing.  Findings were normal except for pertinent findings listed below:   M2  Breath sounds      Radiographs (ct chest and cxr) reviewed:   patient imaging studies reviewed and interpreted independently. My personal interpretation of most resent images include:    X-Ray Chest PA And Lateral  08/10/2023 Lungs clear      Labs: Patients labs reviewed including CBC and CMP  reviewed       Lab Results   Component Value Date    WBC 5.36 12/21/2016    RBC 4.51 12/21/2016    HGB 13.6 12/21/2016    HCT 41.4 12/21/2016    MCV 92 12/21/2016    MCH 30.2 12/21/2016    MCHC 32.9 12/21/2016    RDW 12.5 12/21/2016     12/21/2016    MPV 10.7 12/21/2016    GRAN 3.0 12/21/2016    GRAN 56.1 12/21/2016    LYMPH 1.8 12/21/2016    LYMPH 33.4 12/21/2016    MONO 0.5 12/21/2016    MONO 8.4 12/21/2016    EOS 0.1 12/21/2016    BASO 0.02 12/21/2016    EOSINOPHIL " 1.5 12/21/2016    BASOPHIL 0.4 12/21/2016      Latest Reference Range & Units Most Recent   CO2 23 - 29 mmol/L 29  12/21/16 09:03      Latest Reference Range & Units 05/23/22 16:58   SARS-CoV-2 RNA, Amplification, Qual Negative  Positive !   !: Data is abnormal      PFT reviewed  Pulmonary Functions Testing Results:        Plan:  Clinical impression is apparently straight forward and impression with management as below.    Karolyn was seen today for follow-up and latent tb.    Diagnoses and all orders for this visit:    TB lung, latent  Comments:  - completed therapy    Herpes simplex infection of skin  -     acyclovir 5% (ZOVIRAX) 5 % ointment; Apply topically 6 (six) times daily.          Follow up if symptoms worsen or fail to improve.      Discussed with patient above for education the following:      Patient Instructions   Use cream on sore    Your latent TB is treated. You still require a yearly chest x-ray.

## 2024-10-30 ENCOUNTER — HOSPITAL ENCOUNTER (OUTPATIENT)
Dept: RADIOLOGY | Facility: HOSPITAL | Age: 49
Discharge: HOME | End: 2024-10-30
Attending: NURSE PRACTITIONER
Payer: COMMERCIAL

## 2024-10-30 DIAGNOSIS — R06.02 SHORTNESS OF BREATH: Primary | ICD-10-CM

## 2024-10-30 DIAGNOSIS — R06.02 SHORTNESS OF BREATH: ICD-10-CM

## 2024-10-30 PROCEDURE — 71046 X-RAY EXAM CHEST 2 VIEWS: CPT | Mod: TC

## 2024-10-30 PROCEDURE — 71046 X-RAY EXAM CHEST 2 VIEWS: CPT | Mod: 26,,, | Performed by: RADIOLOGY

## 2024-12-18 ENCOUNTER — OFFICE VISIT (OUTPATIENT)
Dept: PULMONOLOGY | Facility: CLINIC | Age: 49
End: 2024-12-18
Payer: COMMERCIAL

## 2024-12-18 DIAGNOSIS — Z86.15 HISTORY OF LATENT TUBERCULOSIS: Primary | ICD-10-CM

## 2024-12-18 NOTE — PROGRESS NOTES
12/18/2024    Karolyn Flowers  The patient location is: [facility]  The chief complaint leading to consultation is: review Chest X-ray    Visit type: audiovisual    Face to Face time with patient: 15 minutes  30 minutes of total time spent on the encounter, which includes face to face time and non-face to face time preparing to see the patient (eg, review of tests), Obtaining and/or reviewing separately obtained history, Documenting clinical information in the electronic or other health record, Independently interpreting results (not separately reported) and communicating results to the patient/family/caregiver, or Care coordination (not separately reported).         Each patient to whom he or she provides medical services by telemedicine is:  (1) informed of the relationship between the physician and patient and the respective role of any other health care provider with respect to management of the patient; and (2) notified that he or she may decline to receive medical services by telemedicine and may withdraw from such care at any time.    Notes:       Chief Complaint   Patient presents with    Follow-up       HPI:     12/18/2024- states doing well; no complaints of weight loss, fever, or night sweats.  Currently working for a pharmaceutical company.  Requesting letter to present to hospital systems who request TB testing.  Completed treated for latent TB in 2023.  No adverse side-effects from therapy.  Review of chest x-ray from October 30, 2024 shows no abnormalities.    12/14/2023- completed Latent TB therapy for 4 months. No complaint of cough or night sweats.       9/15/2023- states doing well, latent TB medications has no intolerable side effects. On 3rd week of therapy Rifampin 450 mg. States nausea at first starting new medication but is improved with care.  No complaint of cough, shortness of breath, wheeze, or chest tightness.     8/10/2023- positive TB quantiferon gold blood test, works as  . Goes into hospital clinics daily.   No complaint of cough, shortness of breath, chest tightness, or wheeze. No recent weight loss or night sweats.     Social Hx: lives with family and pet dog, no known Asbestosis exposure, no Smoking Hx  Family Hx: no Lung Cancer, COPD, or Asthma  Medical Hx: no previous pneumonia ; no previous shoulder/chest surgery      The chief compliant  problem is stable  PFSH:  Past Medical History:   Diagnosis Date    Acid reflux     Depression     Fever blister          Past Surgical History:   Procedure Laterality Date    ACL replacement      left knee    BLADDER SUSPENSION      TUBAL LIGATION       Social History     Tobacco Use    Smoking status: Never    Smokeless tobacco: Never   Substance Use Topics    Alcohol use: Yes     Alcohol/week: 2.0 - 3.0 standard drinks of alcohol     Types: 2 - 3 Glasses of wine per week     Comment: social     Drug use: No     No family history on file.  Review of patient's allergies indicates:  No Known Allergies  I have reviewed past medical, family, and social history. I have reviewed previous nurse notes.        Performance Status:The patient's activity level is no limits with regular activity.      Review of Systems:  a review of eleven systems covering constitutional, Eye, HEENT, Psych, Respiratory, Cardiac, GI, , Musculoskeletal, Endocrine, Dermatologic was negative except for pertinent findings as listed ABOVE and below: pertinent positive as above, rest is good           Exam:Comprehensive exam done. There were no vitals taken for this visit.  Exam included Vitals as listed, and patient's appearance and affect and alertness and mood, oral exam for yeast and hygiene and pharynx lesions and Mallapatti (M) score, neck with inspection for jvd and masses and thyroid abnormalities and lymph nodes (supraclavicular and infraclavicular nodes and axillary also examined and noted if abn), chest exam included symmetry and  effort and fremitus and percussion and auscultation, cardiac exam included rhythm and gallops and murmur and rubs and jvd and edema, abdominal exam for mass and hepatosplenomegaly and tenderness and hernias and bowel sounds, Musculoskeletal exam with muscle tone and posture and mobility/gait and  strength, and skin for rashes and cyanosis and pallor and turgor, extremity for clubbing.  Findings were normal except for pertinent findings listed below:   M2  Breath sounds      Radiographs (ct chest and cxr) reviewed: CXR  patient imaging studies reviewed and interpreted independently. My personal interpretation of most resent images include:    CXR 10/30/24 lungs clear  X-Ray Chest PA And Lateral  08/10/2023 Lungs clear      Labs: Patients labs reviewed including CBC and CMP  reviewed       Lab Results   Component Value Date    WBC 5.36 12/21/2016    RBC 4.51 12/21/2016    HGB 13.6 12/21/2016    HCT 41.4 12/21/2016    MCV 92 12/21/2016    MCH 30.2 12/21/2016    MCHC 32.9 12/21/2016    RDW 12.5 12/21/2016     12/21/2016    MPV 10.7 12/21/2016    GRAN 3.0 12/21/2016    GRAN 56.1 12/21/2016    LYMPH 1.8 12/21/2016    LYMPH 33.4 12/21/2016    MONO 0.5 12/21/2016    MONO 8.4 12/21/2016    EOS 0.1 12/21/2016    BASO 0.02 12/21/2016    EOSINOPHIL 1.5 12/21/2016    BASOPHIL 0.4 12/21/2016      Latest Reference Range & Units Most Recent   CO2 23 - 29 mmol/L 29  12/21/16 09:03      Latest Reference Range & Units 05/23/22 16:58   SARS-CoV-2 RNA, Amplification, Qual Negative  Positive !   !: Data is abnormal            Plan:  Clinical impression is apparently straight forward and impression with management as below.    Karolyn was seen today for follow-up.    Diagnoses and all orders for this visit:    History of latent tuberculosis  Comments:  - CXR clear, repeat yearly        Follow up in about 1 year (around 12/18/2025), or if symptoms worsen or fail to improve.      Discussed with patient above for education the  following:      Patient Instructions   Letter written as requested, sent through [email or web address]

## 2024-12-18 NOTE — LETTER
December 18, 2024        Karolyn Flowers             [facility]  [address]  [address]  [place] [address]  Phone: [phone]  Fax: [phone]   Patient: Karolyn Flowers   MR Number: [id number]   YOB: 1975   Date of Visit: 12/18/2024     To whom it may concern:    Ms. Flowers followed by [facility] for a positive   quantiferon gold test. She completed treatment for Latent TB  In 2023. Her chest x-ray on 10/30/24 is negative for acute or   chronic changes. She is considered non contagious   and requires no restrictions.           If you have any questions or concerns, please don't hesitate to call.         Sincerely,          Britta Weiss, NP        CC  No Recipients    Enclosure